# Patient Record
(demographics unavailable — no encounter records)

---

## 2024-11-01 NOTE — PHYSICAL EXAM
[2+] : left 2+ [Varicose Veins Of Lower Extremities] : present [No Rash or Lesion] : No rash or lesion [Ankle Swelling (On Exam)] : present [Ankle Swelling Bilaterally] : bilaterally  [] : not present [Skin Ulcer] : no ulcer [de-identified] : alert and oriented in NAD

## 2024-11-01 NOTE — PLAN
[TextEntry] : 68yo F with b/l C3 CVI and RLE SVT lateral calf.  plan for  R GSV RFA  R SAP UGS L GSV RFA  L SSV RFA L SAP UGS  rx provided for 20-30mmHg thigh high compression stockings risks reviewed with patient

## 2024-11-01 NOTE — HISTORY OF PRESENT ILLNESS
[FreeTextEntry1] : History of present illness:      66yo F presents for bilateral CVI and recent phlebitis of right leg.  She c/o heaviness and fatigue in both legs at end of day.  Denies prior hx of DVT or SVT. Denies prior venous intervention.          Duration of symptoms:    Month(s)        What factors precipitate symptoms?      Prolonged standing           Prolonged sitting            Walking        Exercise                          Symptoms have altered activities of daily living by:          Unable to stand for prolonged period of time       Difficulty completing work tasks              Does the patients daily activity require prolonged standing?           Yes     No        Does the patient take OTC medications (ASA, NSAIDs)?       Yes    No       How many times over a 2 week period do they take OTC meds?     0-1     2-4    4-6             Conservative therapy:      Has the patient previously implemented any conservative therapy or OTC medication for alleviation of symptoms related to varicose veins?           Leg elevation        Walking and other forms of exercise       Weight reduction        NSAIDs or Pain meds                                                                           Other:      Graduated, elasticized compression stockings:     Yes    No      If yes, what kind?    Knee-high Thigh high      20-30mmHg    30-40mmHg

## 2024-11-09 NOTE — PROCEDURE
[FreeTextEntry2] : Chronic Venous Insufficiency, Varicose veins with pain [FreeTextEntry1] : Right GSV RFA  [FreeTextEntry3] : Preoperative Diagnosis: Varicose veins, symptomatic, chronic venous insufficiency (I87.2).            Postoperative Diagnosis: Same        Attending: Chelita Scales MD     Assistant: DONNA Carver       				        Procedure:      1.	Endovenous Radiofrequency Ablation of the Right GSV (10443)              Anesthesia:  Local/Tumescent (50cc 1% Lidocaine in 500cc NS)        Complications: none             Length of segment treated:_24 cm    Description: Chart was reviewed, including ultrasound report and operative plan. Consent was obtained from the patient, risks and benefits of the procedure were explained. Prior to the start of the procedure, the patient was examined in the standing position. The skin was marked to identify superficial varicosities.  The patient was then placed on the procedure table and the entire lower extremity was prepped and a sterile field using barriers was created.  Time out was performed.              1. Using ultrasound guidance a 19-guage needle was placed directly into the saphenous vein. The guide wire was then placed and the needle removed.  A 7F sheath for the RFA catheter was then introduced along the guide wire.  The RFA catheter was then introduced through the sheath.  The catheter tip position was then confirmed at approximately 2 cm from the junction.  The perivenous tissue of the saphenous vein was then anesthetized using Tumescent anesthesia 250 cc of 0.1% lidocaine solution. The catheter placement was again confirmed using ultrasound 2cm from the saphenous junction. Segmental ablation of the saphenous was performed with radiofrequency energy using 120 degrees Celsius for 20 sec each segment (each segment was treated twice). The catheter was removed and local pressure applied.  Ultrasound was again carried out, which demonstrated significant venospasm of the saphenous vein and no evidence of trauma to the deep veins.                     The incisions and injection sites were covered with 4x4 gauze and abdominal pads and the extremity was wrapped with kerlix and Coban using standard technique.  This was followed by application of a 20-30 mm Hg graduated venous compression hose.               The patient was given oral and written instructions for aftercare, which included frequent ambulation and leg elevation.  The patient was instructed to take NSAIDs.  The patient was scheduled for a follow-up visit in approximately 5-7 days with a venous duplex ultrasound of the treated leg to evaluate for success of procedure and evaluate for any deep vein thrombosis. The patient tolerated the procedure well and left the office in excellent condition ambulating without difficulty.

## 2024-11-13 NOTE — CONSULT LETTER
[Dear  ___] : Dear  [unfilled], [Consult Letter:] : I had the pleasure of evaluating your patient, [unfilled]. [Sincerely,] : Sincerely, [DrShae  ___] : Dr. TUBBS

## 2024-11-15 NOTE — HISTORY OF PRESENT ILLNESS
[FreeTextEntry1] : This is a 67 year old postmenopausal  female who was seen for a complaint of a left breast/ axillary mass.  She first noted the mass 4-5 years ago.  It has grown in size and shifted in location from upper mid- breast to upper -outer breast/ left axilla.  She notes a history of a benign left breast biopsy over twenty years ago.    Her family history is significant for hormone negative breast cancer in her mother diagnosed at the age of fifty, who  of metastatic disease at the age of 59.  Her father  in his 80's of pancreatic cancer.  A first cousin had thyroid cancer in her 30's.  Her maternal grandmother, a smoker,  from lung cancer in her 70's.    She is of Romansh, English, Danish heritage.    She saw Rosaline Merchant for genetic counseling and although interested in testing, was reluctant to have it since Medicare would not cover it. Testing was done here in the office and Ambry 13 panel negative.  Risk assessment:  ANTONELLA 10 year 14.9%  Lifetime 26.8%  NCI   5 year 3.9% Lifetime 13%   She underwent excision of the left lipoma on 2024.  Pathology showed a lipoma.  She has some swelling in the area but it is not bothering her.

## 2024-11-15 NOTE — PAST MEDICAL HISTORY
[Surgical Menopause] : The patient is in surgical menopause [Menarche Age ____] : age at menarche was [unfilled] [Menopause Age____] : age at menopause was [unfilled] [Approximately ___] : the LMP was approximately [unfilled] [Total Preg ___] : G[unfilled] [Live Births ___] : P[unfilled]  [Age At Live Birth ___] : Age at live birth: [unfilled] [History of Hormone Replacement Treatment] : has no history of hormone replacement treatment [FreeTextEntry5] : total hysterectomy [FreeTextEntry2] : daughters x2 [FreeTextEntry7] : yes 2 years  [FreeTextEntry8] : no

## 2024-11-15 NOTE — PHYSICAL EXAM
[de-identified] : Small incision near lateral edge of pectoral muscle healing well. Suture end removed.  Small seroma at site verified on ultrasound

## 2024-11-15 NOTE — DATA REVIEWED
[FreeTextEntry1] : Bilateral mammogram ([facility]) 8/21/2024:  No evidence of malignancy.   Last year: Bilateral breast ultrasound 8/5/2023: Left axilla [id number] mm slightly echogenic superficial ovoid finding favored to be lipomatous finding.  Does not appear significantly changed in size from 2021 exam.  Bilateral breast MRI 10/7/2024:  No MRI evidence of malignancy.

## 2024-11-15 NOTE — HISTORY OF PRESENT ILLNESS
[FreeTextEntry1] : This is a 67 year old postmenopausal  female who was seen for a complaint of a left breast/ axillary mass.  She first noted the mass 4-5 years ago.  It has grown in size and shifted in location from upper mid- breast to upper -outer breast/ left axilla.  She notes a history of a benign left breast biopsy over twenty years ago.    Her family history is significant for hormone negative breast cancer in her mother diagnosed at the age of fifty, who  of metastatic disease at the age of 59.  Her father  in his 80's of pancreatic cancer.  A first cousin had thyroid cancer in her 30's.  Her maternal grandmother, a smoker,  from lung cancer in her 70's.    She is of Faroese, English, Greek heritage.    She saw Rosaline Merchant for genetic counseling and although interested in testing, was reluctant to have it since Medicare would not cover it. Testing was done here in the office and Ambry 13 panel negative.  Risk assessment:  ANTONELLA 10 year 14.9%  Lifetime 26.8%  NCI   5 year 3.9% Lifetime 13%   She underwent excision of the left lipoma on 2024.  Pathology showed a lipoma.  She has some swelling in the area but it is not bothering her.

## 2024-11-15 NOTE — PHYSICAL EXAM
[de-identified] : Small incision near lateral edge of pectoral muscle healing well. Suture end removed.  Small seroma at site verified on ultrasound

## 2024-11-15 NOTE — DATA REVIEWED
[FreeTextEntry1] : Bilateral mammogram ([facility]) 8/21/2024:  No evidence of malignancy.   Last year: Bilateral breast ultrasound 8/5/2023: Left axilla 15u3b41 mm slightly echogenic superficial ovoid finding favored to be lipomatous finding.  Does not appear significantly changed in size from 2021 exam.  Bilateral breast MRI 10/7/2024:  No MRI evidence of malignancy.

## 2024-11-25 NOTE — REASON FOR VISIT
Medication(s) to Refill:   Requested Prescriptions     Pending Prescriptions Disp Refills   • VALACYCLOVIR  MG Oral Tab [Pharmacy Med Name: VALACYCLOVIR 500MG TABLETS] 30 tablet 0     Sig: TAKE 1 TABLET(500 MG) BY MOUTH EVERY DAY         Reason for [Follow-Up: _____] : a [unfilled] follow-up visit

## 2024-11-27 NOTE — PHYSICAL EXAM
[de-identified] : Small incision near lateral edge of pectoral muscle healing well.  Swelling decreased. Small seroma at site verified on ultrasound.

## 2024-11-27 NOTE — HISTORY OF PRESENT ILLNESS
[FreeTextEntry1] : This is a 67 year old postmenopausal  female who was seen for a complaint of a left breast/ axillary mass.  She first noted the mass 4-5 years ago.  It has grown in size and shifted in location from upper mid- breast to upper -outer breast/ left axilla.  She notes a history of a benign left breast biopsy over twenty years ago.    Her family history is significant for hormone negative breast cancer in her mother diagnosed at the age of fifty, who  of metastatic disease at the age of 59.  Her father  in his 80's of pancreatic cancer.  A first cousin had thyroid cancer in her 30's.  Her maternal grandmother, a smoker,  from lung cancer in her 70's.    She is of Turkish, English, Romanian heritage.    She saw Rosaline Merchant for genetic counseling and although interested in testing, was reluctant to have it since Medicare would not cover it. Testing was done here in the office and Ambry 13 panel negative.  Risk assessment:  ANTONELLA 10 year 14.9%  Lifetime 26.8%  NCI   5 year 3.9% Lifetime 13%   She underwent excision of the left lipoma on 2024.  Pathology showed a lipoma.  The surgical area is less swollen.

## 2024-11-27 NOTE — PHYSICAL EXAM
[de-identified] : Small incision near lateral edge of pectoral muscle healing well.  Swelling decreased. Small seroma at site verified on ultrasound.

## 2024-11-27 NOTE — HISTORY OF PRESENT ILLNESS
[FreeTextEntry1] : This is a 67 year old postmenopausal  female who was seen for a complaint of a left breast/ axillary mass.  She first noted the mass 4-5 years ago.  It has grown in size and shifted in location from upper mid- breast to upper -outer breast/ left axilla.  She notes a history of a benign left breast biopsy over twenty years ago.    Her family history is significant for hormone negative breast cancer in her mother diagnosed at the age of fifty, who  of metastatic disease at the age of 59.  Her father  in his 80's of pancreatic cancer.  A first cousin had thyroid cancer in her 30's.  Her maternal grandmother, a smoker,  from lung cancer in her 70's.    She is of [place], English, Korean heritage.    She saw Rosaline Merchant for genetic counseling and although interested in testing, was reluctant to have it since Medicare would not cover it. Testing was done here in the office and Ambry 13 panel negative.  Risk assessment:  ANTONELLA 10 year 14.9%  Lifetime 26.8%  NCI   5 year 3.9% Lifetime 13%   She underwent excision of the left lipoma on 2024.  Pathology showed a lipoma.  The surgical area is less swollen.

## 2024-11-27 NOTE — PROCEDURE
[FreeTextEntry1] : I&D [FreeTextEntry2] : phlebitis [FreeTextEntry3] : Preoperative Diagnosis: Varicose veins, symptomatic, phlebitis Postoperative Diagnosis: Same      Attending:  Chelita Scales MD     Assistant: KATARINA Orlando       				      Procedure:      1.	Incision and drainage of right leg phlebitis          Anesthesia: x Local/Tumescent (50cc 1% Lidocaine in 500cc NS) _ MAC       Complications: none              Description: Chart was reviewed, including ultrasound report and operative plan. Consent was obtained from the patient, risks and benefits of the procedure were explained. Prior to the start of the procedure, the patient was examined in the standing position. The skin was marked to identify superficial varicosities.  The patient was then placed on the procedure table and the entire lower extremity was prepped and a sterile field using barriers was created.  Time out was performed.             1. The area of the marked surface varicosities was anesthetized using tumescent anesthesia, 50 cc of 0.1% lidocaine solution. The marked area of phlebitis was accessed with a 16G Nokor Needle.  Thrombus was extracted, the diseased vein segments were removed using phlebectomy hooks and mosquitoes.  Hemostasis was obtained with compression and wash-out of the subcutaneous space.            The incisions and injection sites were covered with 4x4 gauze and abdominal pads and the extremity was wrapped with kerlix and Coban using standard technique.  This was followed by application of a 20-30 mm Hg graduated venous compression hose.               The patient was given oral and written instructions for aftercare, which included frequent ambulation and leg elevation.  The patient was instructed to take NSAIDs.  The patient was scheduled for a follow-up visit in approximately 5-7 days with a venous duplex ultrasound of the treated leg to evaluate for success of procedure and evaluate for any deep vein thrombosis. The patient tolerated the procedure well and left the office in excellent condition ambulating without difficulty.

## 2024-11-27 NOTE — DATA REVIEWED
[FreeTextEntry1] : Bilateral mammogram ([facility]) 8/21/2024:  No evidence of malignancy.   Last year: Bilateral breast ultrasound 8/5/2023: Left axilla 94u8i36 mm slightly echogenic superficial ovoid finding favored to be lipomatous finding.  Does not appear significantly changed in size from 2021 exam.  Bilateral breast MRI 10/7/2024:  No MRI evidence of malignancy.

## 2024-11-27 NOTE — DATA REVIEWED
[FreeTextEntry1] : Bilateral mammogram ([facility]) 8/21/2024:  No evidence of malignancy.   Last year: Bilateral breast ultrasound 8/5/2023: Left axilla 17p8w81 mm slightly echogenic superficial ovoid finding favored to be lipomatous finding.  Does not appear significantly changed in size from 2021 exam.  Bilateral breast MRI 10/7/2024:  No MRI evidence of malignancy.

## 2024-12-06 NOTE — DISCUSSION/SUMMARY
[FreeTextEntry1] : 67 year old female with CVI, SVT s/p gsv aye and I &D of thrombophlebitis now with EHIT -will begin 10mg xarelto qd, will send rx for 30 days to be taken as prophylaxis after additional left LE interventions  -will need right sap ugs for residual dilated posterior calf vv

## 2024-12-06 NOTE — PHYSICAL EXAM
[2+] : right 2+ [Ankle Swelling (On Exam)] : not present [FreeTextEntry1] : RLE warm, well perfused  resolving SVT without cellulitis no residual dilated vv  telangiectasias noted   Duplex: GSV thrombosed; tail extension of thrombus into the CFV  thrombosed prox calf vv

## 2025-01-12 NOTE — HISTORY OF PRESENT ILLNESS
[FreeTextEntry1] : 25 Last seen  initial presentation RASH..   Still with recurrent rash/ pruritus...  Has high blood histamine level and WBCs have deformity (pelger-huet- ? primary vs. 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  Switched to 300mg biweekly Adbry (IL13 inhibitor) with Ricardo Thompson allergist and face improved, but hasn't taken since Dec 8 - Adbry causes back to break out as well- now off both Saw derm, lipoma taken out of L axillary area- benign Presents with diffuse pruritus today but no overt rash.    No active joint pain/ synovitis no or in past few years   GI Was taking 40mg Omeprazole, EGD found to have severe HH-> required sx (stomach fully within chest wall cavity) minimal symptoms despite severity.  Repair surgery in 2024 ([facility]), no Sx since and no further need for PPI Liquid diet for 2w around surgery improved her rash.., may have had silent reflux for years beforehand  Osteoporosis Has been taking Evista for several years monotx - no hx fractures.   Exercises 4 days per week for more than 35-40min - pilates, spin  Bone density 2023 Most severe T-score -3.3 at L2 spine -2.7 at R femoral neck No VFA FRAX calcs: overall 24% risk of fracture, 6% risk of hip fracture  Labs 10/24 most recent available Normal CBC, CMP, CRP, S-PEP  Cardio/vasc Experiences some dizziness due to low blood pressure- better lately  REcent superficial blood clot (hx large varicosities) x 2.. was on Eliquis- 2.5 mg BID, now off..   Neg Hepatitis test Quantiferon negative  1) Atopic Dermatitis:  2) Athralgias with + CCP 64, +dsDNA/ DREAD 3) HYpothyroidism 4) Aneurysm Occipital and new onset superficial blood clot BLE with Large varicosities 5) Osteoporosis with hx trauma induced tibial fracture    _________________________________________________________ INitial presentation  - presenting for consultation for flares of rashes.......has had flares in , September and currently presenting in office w/rash throughout scalp, under breasts, neck , and face....she reports that current rash is "calm" today.....she has used antibiotics and creams in the past, but dupixen has been the most effective.....dupixen is currently stopping the itching, but the rash is still present - during most recent flare she had swollen lymph nodes in her neck....and difficulty raising her R shoulder  - No FH of rheumatoid arthritis  - Denies DVT, strokes, or myocardial infarctions.....has had an aneurysm in the past and currently has varicose veins - Hx of miscarriage at 12 weeks.....M1 - Up to date on DEXA, everything was normal  - 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 Osgood.. suggested possible SLE.. with multiple mildly elevated labs:  + DREAD (not quanitified), low + dsDNA, Scl70, low C4, mildly elevated CK, Ferritin and triple + APS studies x 1 (available for review)  Diffuse rash.. etiology unclear after extensive (difffuse) w/u started on dupixent for ezcema with + response.. greatly improved today .. but wants to r/o SLE Denies significant fatigue, fevers/ chills, lymphadenopathy, + lipoma on left chest wall... ys ago hair thinning with hypothyroidism improved with replacement for many  No mucositis, sicca, cardiopulmonary.. till recently now with R sided pleuritic pain (first episode - 1m ago and getting better), no raynauds but hands get numb and tingling in finger tips  severe varicosities.. seen by vasc sx.. no sx indicated , 1 miscarriage at 12 wk (r/t mechanical issues w/ adherance),  + Aneurysm around occipital: presented with severe HA.. watching at this point, no vision change.. HA now resolved.. for past yr.. 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 no MI/ DVT/ PE/ CVA..  Taking 325 mg asa..  REcent tibial fracture with fracture... Dexa:  osteopenia.   Post menopausal    Hematology:  w/u Dr. Darren byrnes

## 2025-01-12 NOTE — REVIEW OF SYSTEMS
[Lower Ext Edema] : lower extremity edema [Swollen Glands In The Neck] : swollen glands in the neck [Negative] : Heme/Lymph [As Noted in HPI] : as noted in HPI [Heartburn] : heartburn [de-identified] : Claims memory loss-  [Joint Pain] : no joint pain [FreeTextEntry2] : stable weight [FreeTextEntry3] : denies IED  [FreeTextEntry4] : Soft teeth- but no active dental issues - absolutely denies oral/ nasal ulcerations  [FreeTextEntry5] : varicose veins - large and NEW onset superficial thrombophlebitis- chronic venous insufficiency [FreeTextEntry7] : VERY large HH s/p repair '24 doing well  [FreeTextEntry8] : medically-induced menopause in 2007- no vaginal ulcerations  [FreeTextEntry9] : denies at this time..  [de-identified] : Rash resolved but ++  pruritus.  Previous diffuse rash throughout face, scalp, neck, and under breasts; denies EN, or other distinct lesions. Denies pathergy [de-identified] : hx mild memory loss in past, minimal now [de-identified] : swollen lymph nodes in neck - in past, not active in past year or more

## 2025-01-12 NOTE — CONSULT LETTER
[Dear  ___] : Dear  [unfilled], [Courtesy Letter:] : I had the pleasure of seeing your patient, [unfilled], in my office today. [Referral Closing:] : Thank you very much for seeing this patient.  If you have any questions, please do not hesitate to contact me. [Sincerely,] : Sincerely, [FreeTextEntry2] : Tavares Negro MD  [FreeTextEntry1] : Last seen , updated evaluation:    66 yo w/ atopic dermatitis, Hypothyroidism, + occipital aneurysm (small being followed) and new onset superficial thrombophlebitis x 2 (), osteopenia-> Osteoporosis, and increase in joint pain lately.. feels sx worse since dupixent with suggestion of SLE/ AI process, here for repeat eval.. seen ys ago Dr. Osgood rheum w/ neg w/u beyond DREAD 1:160H/S, now with + CCP, dsDNA and + ACL/ B2G... with low C3/4..  Not seen since ..   CC:  diffuse pruritus, rash- still unclear etiology.. and Osteoporosis  1) Atopic dermatitis: Low complements- CH50 24/ low C3 repeatedly, recently diffuse, failed to respond to traditional tx.. started use of dupixent with excellent control for past few years-> but recent lost efficacy and switched to  Adbry ([place]13i only) working with Dr Stark allergist...but developed  severe red/ swollen face and stopped.. now rash remains controlled but diffuse pruritus.  bx  suggests granulomatous changes.?? NO previous EN or other distinct nodular/ ulcerative lesions.  - discussed with Dr. Causey updated labs + ACL/B2G w/ neg LAC and all other serologies negative except low C4.. at 3, will check CH50 but rash has not been urticarial at all ..  Pt asking about high histamine levels/ mast cell activation syndrome (reportedly w/u neg in past)  2) Arthralgias w/ with multiple mildly elevated labs:  + DREAD (not quanitified) and low + CCP 64, low + dsDNA- now neg, Scl70, low C4 and + ACL/ B2G IGG/M/A, mildly elevated CK, Ferritin and triple + APS studies x 1 No recent arthralgias or synovitis (ever)..  One episode pleuritis years ago, nothing recent..  Still denies significant fatigue, fevers/ chills, lymphadenopathy, no mucositis, sicca, cardiopulmonary, absolutely no raynauds but hands get numb and tingling in finger tips. NO evidence otherwise of RA at this time , 1 miscarriage at 12 wk (r/t mechanical issues w/ adherance), with no hx of cytopenias or no MI/ DVT/ PE/ CVA.. See below for new onset thrombophlebitis.. 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 - Varicosities: seen by vasc sx.. no sx indicated, but suggest compression stockings to minimize pooling given APS antibodies  - updated APS eval needed now (especially given CBC with clumped plt) - Consideration for possible Behcet's given vascular changes as described but does NOT have hx IED, mucositis or other features.  Will check [id number]   5) Osteoporosis: Most recent Dexa 23 Most severe T-score -3.3 at L2 spine and -2.7 at R femoral neck No VFA with FRAX overall 24% risk of fracture, 6% risk of hip fracture  tibial fracture trauma induced, - no recent new fractures since . Has been c/w taking Evista for several years monotx    Exercises 4 days per week for more than 35-40min - pilates, spin Menopause: sx age 50 no formal HRT  Tx considerations:  no active dental issues (but hx of multiple issues in past- check routinely), no hx renal calculi, or hx of radiation therapy.   Metabolic w/u: 25 nl PTH/ Ca, Vit D 39, TFTs, CBC, CMP, CRP/ ESR, SPEP, Fe, Mg, Po4, B12/folate, A1c, neg Celiac NOTE: - DXA 19 with most severe T score -2.8  at LFN - 2.4 at spine.   Initially dx  on Fosamax x 10 ys - drug holiday x 1 year.. then  started Evista.. tolerated well- stopped    Discussion:  needs more aggressive tx given obvious progression from previous studies... despite Evista.  Highly recommend use of Prolia for now.. but could theoretically consider PTH.. would not be candidate for romosozumab 2/2 APS studies and thrombophlebitis.  Would suggest starting with Prolia initially given lack of fragility fractures.      Plan 25: - Begin Prolia as soon as approved  - Stop Evista  - Ordered new labs today - check IgE and histamine.  Also concerned for possible APS / or Behcet's labs pending will need RPA   - See dentist at least 2x per year and baseline to confirm no acute issues. If extensive dental issues needed (implants/ extraction) may consider PTH...   - continue to f/u with allergist/ derm to manage rash/ pruritus   - Is aware to call if there is any change in her underlying symptoms.  [FreeTextEntry3] : Bee Damon DNP, [facility] Division or Rheumatology [facility]

## 2025-01-12 NOTE — DATA REVIEWED
[FreeTextEntry1] : Labs \par  10/22 positive: C4 is 3 (decrease), CCP 64,  (neg TG), B2G:  IGA 26, IGG 51, IGM 41, ACL: IGG 26,  , IGA 16; neg: RF, APS/LAC, dsDNA, STAN[place]ORD, SSA/B, DREAD; nl UA, CBC, ESR, CMP, CRP, C3\par  \par  2/21 low C4- 4, nl C3,  + ACL/ B2G, and Phosphatidyl serine triple +, low + dsDNA, low + Scl70 25, nl CBC, , CMP, SPEP, ESR/CRP , + DREAD (not quantified), IGG subclasses\par  + TPO/ TG, nl TFT, neg tTg/ Endomysial/ gliadin, INES/ ScL70 , ANCA P/C, MPO/ PR3, STANFORD, SSA/B, SMA, AMA, JO1, , RF/ CCP, HLAB27, Immunofixation, QFT, Hep B/ C, Lyme, ACE, no proteinuria

## 2025-01-12 NOTE — PHYSICAL EXAM
[General Appearance - Alert] : alert [General Appearance - In No Acute Distress] : in no acute distress [General Appearance - Well Nourished] : well nourished [General Appearance - Well Developed] : well developed [General Appearance - Well-Appearing] : healthy appearing [Sclera] : the sclera and conjunctiva were normal [PERRL With Normal Accommodation] : pupils were equal in size, round, and reactive to light [Extraocular Movements] : extraocular movements were intact [Outer Ear] : the ears and nose were normal in appearance [Nasal Cavity] : the nasal mucosa and septum were normal [Oropharynx] : the oropharynx was normal [Neck Appearance] : the appearance of the neck was normal [Neck Cervical Mass (___cm)] : no neck mass was observed [Jugular Venous Distention Increased] : there was no jugular-venous distention [Thyroid Diffuse Enlargement] : the thyroid was not enlarged [Thyroid Nodule] : there were no palpable thyroid nodules [Auscultation Breath Sounds / Voice Sounds] : lungs were clear to auscultation bilaterally [Heart Rate And Rhythm] : heart rate was normal and rhythm regular [Heart Sounds] : normal S1 and S2 [Heart Sounds Gallop] : no gallops [Murmurs] : no murmurs [Heart Sounds Pericardial Friction Rub] : no pericardial rub [Full Pulse] : the pedal pulses are present [Edema] : there was no peripheral edema [Cervical Lymph Nodes Enlarged Posterior Bilaterally] : posterior cervical [Cervical Lymph Nodes Enlarged Anterior Bilaterally] : anterior cervical [Supraclavicular Lymph Nodes Enlarged Bilaterally] : supraclavicular [No CVA Tenderness] : no ~M costovertebral angle tenderness [No Spinal Tenderness] : no spinal tenderness [Skin Color & Pigmentation] : normal skin color and pigmentation [Skin Turgor] : normal skin turgor [No Focal Deficits] : no focal deficits [Sensation] : the sensory exam was normal to light touch and pinprick [Oriented To Time, Place, And Person] : oriented to person, place, and time [Impaired Insight] : insight and judgment were intact [Affect] : the affect was normal [Abnormal Walk] : normal gait [Nail Clubbing] : no clubbing  or cyanosis of the fingernails [Musculoskeletal - Swelling] : no joint swelling seen [Motor Tone] : muscle strength and tone were normal [Respiration, Rhythm And Depth] : normal respiratory rhythm and effort [Exaggerated Use Of Accessory Muscles For Inspiration] : no accessory muscle use [Bowel Sounds] : normal bowel sounds [Abdomen Soft] : soft [Abdomen Tenderness] : non-tender [] : no hepato-splenomegaly [Abdomen Mass (___ Cm)] : no abdominal mass palpated [Axillary Lymph Nodes Enlarged Bilaterally] : axillary [Inguinal Lymph Nodes Enlarged Bilaterally] : inguinal [FreeTextEntry1] : NO active synovitis now or last visit 2022.   There were no flexion contractures or deformities  in any joints. All joints have full ROM  without warmth, erythema, effusion or tenderness.

## 2025-01-12 NOTE — CONSULT LETTER
[Dear  ___] : Dear  [unfilled], [Courtesy Letter:] : I had the pleasure of seeing your patient, [unfilled], in my office today. [Referral Closing:] : Thank you very much for seeing this patient.  If you have any questions, please do not hesitate to contact me. [Sincerely,] : Sincerely, [FreeTextEntry2] : Tavares Negro MD  [FreeTextEntry1] : Last seen , updated evaluation:    68 yo w/ atopic dermatitis, Hypothyroidism, + occipital aneurysm (small being followed) and new onset superficial thrombophlebitis x 2 (), osteopenia-> Osteoporosis, and increase in joint pain lately.. feels sx worse since dupixent with suggestion of SLE/ AI process, here for repeat eval.. seen ys ago Dr. Osgood rheum w/ neg w/u beyond DREAD 1:160H/S, now with + CCP, dsDNA and + ACL/ B2G... with low C3/4..  Not seen since ..   CC:  diffuse pruritus, rash- still unclear etiology.. and Osteoporosis  1) Atopic dermatitis: Low complements- CH50 24/ low C3 repeatedly, recently diffuse, failed to respond to traditional tx.. started use of dupixent with excellent control for past few years-> but recent lost efficacy and switched to  Adbry ([place]13i only) working with Dr Stark allergist...but developed  severe red/ swollen face and stopped.. now rash remains controlled but diffuse pruritus.  bx  suggests granulomatous changes.?? NO previous EN or other distinct nodular/ ulcerative lesions.  - discussed with Dr. Causey updated labs + ACL/B2G w/ neg LAC and all other serologies negative except low C4.. at 3, will check CH50 but rash has not been urticarial at all ..  Pt asking about high histamine levels/ mast cell activation syndrome (reportedly w/u neg in past)  2) Arthralgias w/ with multiple mildly elevated labs:  + DREAD (not quanitified) and low + CCP 64, low + dsDNA- now neg, Scl70, low C4 and + ACL/ B2G IGG/M/A, mildly elevated CK, Ferritin and triple + APS studies x 1 No recent arthralgias or synovitis (ever)..  One episode pleuritis years ago, nothing recent..  Still denies significant fatigue, fevers/ chills, lymphadenopathy, no mucositis, sicca, cardiopulmonary, absolutely no raynauds but hands get numb and tingling in finger tips. NO evidence otherwise of RA at this time , 1 miscarriage at 12 wk (r/t mechanical issues w/ adherance), with no hx of cytopenias or no MI/ DVT/ PE/ CVA.. See below for new onset thrombophlebitis.. 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 - Varicosities: seen by vasc sx.. no sx indicated, but suggest compression stockings to minimize pooling given APS antibodies  - updated APS eval needed now (especially given CBC with clumped plt) - Consideration for possible Behcet's given vascular changes as described but does NOT have hx IED, mucositis or other features.  Will check [id number]   5) Osteoporosis: Most recent Dexa 23 Most severe T-score -3.3 at L2 spine and -2.7 at R femoral neck No VFA with FRAX overall 24% risk of fracture, 6% risk of hip fracture  tibial fracture trauma induced, - no recent new fractures since . Has been c/w taking Evista for several years monotx    Exercises 4 days per week for more than 35-40min - pilates, spin Menopause: sx age 50 no formal HRT  Tx considerations:  no active dental issues (but hx of multiple issues in past- check routinely), no hx renal calculi, or hx of radiation therapy.   Metabolic w/u: 25 nl PTH/ Ca, Vit D 39, TFTs, CBC, CMP, CRP/ ESR, SPEP, Fe, Mg, Po4, B12/folate, A1c, neg Celiac NOTE: - DXA 19 with most severe T score -2.8  at LFN - 2.4 at spine.   Initially dx  on Fosamax x 10 ys - drug holiday x 1 year.. then  started Evista.. tolerated well- stopped    Discussion:  needs more aggressive tx given obvious progression from previous studies... despite Evista.  Highly recommend use of Prolia for now.. but could theoretically consider PTH.. would not be candidate for romosozumab 2/2 APS studies and thrombophlebitis.  Would suggest starting with Prolia initially given lack of fragility fractures.      Plan 25: - Begin Prolia as soon as approved  - Stop Evista  - Ordered new labs today - check IgE and histamine.  Also concerned for possible APS / or Behcet's labs pending will need RPA   - See dentist at least 2x per year and baseline to confirm no acute issues. If extensive dental issues needed (implants/ extraction) may consider PTH...   - continue to f/u with allergist/ derm to manage rash/ pruritus   - Is aware to call if there is any change in her underlying symptoms.  [FreeTextEntry3] : Bee Damon DNP, ANP-[facility] Division or Rheumatology [facility]

## 2025-01-12 NOTE — ASSESSMENT
[FreeTextEntry1] : 66 yo w/ atopic dermatitis, Hypothyroidism, + occipital aneurysm (small being followed) and new onset superficial thrombophlebitis x 2 (), osteopenia-> Osteoporosis, and increase in joint pain lately.. feels sx worse since dupixent with suggestion of SLE/ AI process, here for repeat eval.. seen ys ago Dr. Osgood rheum w/ neg w/u beyond DREAD 1:160H/S, now with + CCP, dsDNA and + ACL/ B2G... with low C3/4..  Not seen since ..   CC:  diffuse pruritus, rash- still unclear etiology.. and Osteoporosis  1) Atopic dermatitis: Low complements- CH50 24/ low C3 repeatedly, recently diffuse, failed to respond to traditional tx.. started use of dupixent with excellent control for past few years-> but recent lost efficacy and switched to  Adbry ([place]13i only) working with Dr Stark allergist...but developed  severe red/ swollen face and stopped.. now rash remains controlled but diffuse pruritus.  bx  suggests granulomatous changes.?? NO previous EN or other distinct nodular/ ulcerative lesions.  - discussed with Dr. Causey updated labs + ACL/B2G w/ neg LAC and all other serologies negative except low C4.. at 3, will check CH50 but rash has not been urticarial at all ..  Pt asking about high histamine levels/ mast cell activation syndrome (reportedly w/u neg in past)  2) Arthralgias w/ with multiple mildly elevated labs:  + DREAD (not quanitified) and low + CCP 64, low + dsDNA- now neg, Scl70, low C4 and + ACL/ B2G IGG/M/A, mildly elevated CK, Ferritin and triple + APS studies x 1 No recent arthralgias or synovitis (ever)..  One episode pleuritis years ago, nothing recent..  Still denies significant fatigue, fevers/ chills, lymphadenopathy, no mucositis, sicca, cardiopulmonary, absolutely no raynauds but hands get numb and tingling in finger tips. NO evidence otherwise of RA at this time , 1 miscarriage at 12 wk (r/t mechanical issues w/ adherance), with no hx of cytopenias or no MI/ DVT/ PE/ CVA.. See below for new onset thrombophlebitis.. 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 - Varicosities: seen by vasc sx.. no sx indicated, but suggest compression stockings to minimize pooling given APS antibodies  - updated APS eval needed now (especially given CBC with clumped plt) - Consideration for possible Behcet's given vascular changes as described but does NOT have hx IED, mucositis or other features.  Will check [id number]   5) Osteoporosis: Most recent Dexa 23 Most severe T-score -3.3 at L2 spine and -2.7 at R femoral neck No VFA with FRAX overall 24% risk of fracture, 6% risk of hip fracture  tibial fracture trauma induced, - no recent new fractures since . Has been c/w taking Evista for several years monotx    Exercises 4 days per week for more than 35-40min - pilates, spin Menopause: sx age 50 no formal HRT  Tx considerations:  no active dental issues (but hx of multiple issues in past- check routinely), no hx renal calculi, or hx of radiation therapy.   Metabolic w/u: 25 nl PTH/ Ca, Vit D 39, TFTs, CBC, CMP, CRP/ ESR, SPEP, Fe, Mg, Po4, B12/folate, A1c, neg Celiac NOTE: - DXA 19 with most severe T score -2.8  at LFN - 2.4 at spine.   Initially dx  on Fosamax x 10 ys - drug holiday x 1 year.. then  started Evista.. tolerated well- stopped    Discussion:  needs more aggressive tx given obvious progression from previous studies... despite Evista.  Highly recommend use of Prolia for now.. but could theoretically consider PTH.. would not be candidate for romosozumab 2/2 APS studies and thrombophlebitis.  Would suggest starting with Prolia initially given lack of fragility fractures.      Plan 25: - Begin Prolia as soon as approved  - Stop Evista  - Ordered new labs today - check IgE and histamine.  Also concerned for possible APS / or Behcet's labs pending will need RPA   - See dentist at least 2x per year and baseline to confirm no acute issues. If extensive dental issues needed (implants/ extraction) may consider PTH...   - continue to f/u with allergist/ derm to manage rash/ pruritus   - Is aware to call if there is any change in her underlying symptoms.

## 2025-01-12 NOTE — PHYSICAL EXAM
[General Appearance - Alert] : alert [General Appearance - In No Acute Distress] : in no acute distress [General Appearance - Well Nourished] : well nourished [General Appearance - Well Developed] : well developed [General Appearance - Well-Appearing] : healthy appearing [Sclera] : the sclera and conjunctiva were normal [PERRL With Normal Accommodation] : pupils were equal in size, round, and reactive to light [Extraocular Movements] : extraocular movements were intact [Outer Ear] : the ears and nose were normal in appearance [Nasal Cavity] : the nasal mucosa and septum were normal [Oropharynx] : the oropharynx was normal [Neck Appearance] : the appearance of the neck was normal [Neck Cervical Mass (___cm)] : no neck mass was observed [Jugular Venous Distention Increased] : there was no jugular-venous distention [Thyroid Diffuse Enlargement] : the thyroid was not enlarged [Thyroid Nodule] : there were no palpable thyroid nodules [Auscultation Breath Sounds / Voice Sounds] : lungs were clear to auscultation bilaterally [Heart Rate And Rhythm] : heart rate was normal and rhythm regular [Heart Sounds] : normal S1 and S2 [Heart Sounds Gallop] : no gallops [Murmurs] : no murmurs [Heart Sounds Pericardial Friction Rub] : no pericardial rub [Full Pulse] : the pedal pulses are present [Edema] : there was no peripheral edema [Cervical Lymph Nodes Enlarged Posterior Bilaterally] : posterior cervical [Cervical Lymph Nodes Enlarged Anterior Bilaterally] : anterior cervical [Supraclavicular Lymph Nodes Enlarged Bilaterally] : supraclavicular [No CVA Tenderness] : no ~M costovertebral angle tenderness [No Spinal Tenderness] : no spinal tenderness [Skin Color & Pigmentation] : normal skin color and pigmentation [Skin Turgor] : normal skin turgor [No Focal Deficits] : no focal deficits [Sensation] : the sensory exam was normal to light touch and pinprick [Oriented To Time, Place, And Person] : oriented to person, place, and time [Impaired Insight] : insight and judgment were intact [Affect] : the affect was normal [Abnormal Walk] : normal gait [Nail Clubbing] : no clubbing  or cyanosis of the fingernails [Musculoskeletal - Swelling] : no joint swelling seen [Motor Tone] : muscle strength and tone were normal [Respiration, Rhythm And Depth] : normal respiratory rhythm and effort [Exaggerated Use Of Accessory Muscles For Inspiration] : no accessory muscle use [Bowel Sounds] : normal bowel sounds [Abdomen Soft] : soft [Abdomen Tenderness] : non-tender [Abdomen Mass (___ Cm)] : no abdominal mass palpated [] : no hepato-splenomegaly [Axillary Lymph Nodes Enlarged Bilaterally] : axillary [Inguinal Lymph Nodes Enlarged Bilaterally] : inguinal [FreeTextEntry1] : NO active synovitis now or last visit 2022.   There were no flexion contractures or deformities  in any joints. All joints have full ROM  without warmth, erythema, effusion or tenderness.

## 2025-01-12 NOTE — REVIEW OF SYSTEMS
[Lower Ext Edema] : lower extremity edema [Swollen Glands In The Neck] : swollen glands in the neck [Negative] : Heme/Lymph [As Noted in HPI] : as noted in HPI [Heartburn] : heartburn [de-identified] : Claims memory loss-  [Joint Pain] : no joint pain [FreeTextEntry2] : stable weight [FreeTextEntry3] : denies IED  [FreeTextEntry4] : Soft teeth- but no active dental issues - absolutely denies oral/ nasal ulcerations  [FreeTextEntry5] : varicose veins - large and NEW onset superficial thrombophlebitis- chronic venous insufficiency [FreeTextEntry7] : VERY large HH s/p repair '24 doing well  [FreeTextEntry8] : medically-induced menopause in 2007- no vaginal ulcerations  [FreeTextEntry9] : denies at this time..  [de-identified] : Rash resolved but ++  pruritus.  Previous diffuse rash throughout face, scalp, neck, and under breasts; denies EN, or other distinct lesions. Denies pathergy [de-identified] : hx mild memory loss in past, minimal now [de-identified] : swollen lymph nodes in neck - in past, not active in past year or more

## 2025-01-12 NOTE — HISTORY OF PRESENT ILLNESS
[FreeTextEntry1] : 25 Last seen  initial presentation RASH..   Still with recurrent rash/ pruritus...  Has high blood histamine level and WBCs have deformity (pelger-huet- ? primary vs. 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  Switched to 300mg biweekly Adbry (IL13 inhibitor) with Ricardo Thompson allergist and face improved, but hasn't taken since Dec 8 - Adbry causes back to break out as well- now off both Saw derm, lipoma taken out of L axillary area- benign Presents with diffuse pruritus today but no overt rash.    No active joint pain/ synovitis no or in past few years   GI Was taking 40mg Omeprazole, EGD found to have severe HH-> required sx (stomach fully within chest wall cavity) minimal symptoms despite severity.  Repair surgery in 2024 (Du), no Sx since and no further need for PPI Liquid diet for 2w around surgery improved her rash.., may have had silent reflux for years beforehand  Osteoporosis Has been taking Evista for several years monotx - no hx fractures.   Exercises 4 days per week for more than 35-40min - pilates, spin  Bone density 2023 Most severe T-score -3.3 at L2 spine -2.7 at R femoral neck No VFA FRAX calcs: overall 24% risk of fracture, 6% risk of hip fracture  Labs 10/24 most recent available Normal CBC, CMP, CRP, S-PEP  Cardio/vasc Experiences some dizziness due to low blood pressure- better lately  REcent superficial blood clot (hx large varicosities) x 2.. was on Eliquis- 2.5 mg BID, now off..   Neg Hepatitis test Quantiferon negative  1) Atopic Dermatitis:  2) Athralgias with + CCP 64, +dsDNA/ DREAD 3) HYpothyroidism 4) Aneurysm Occipital and new onset superficial blood clot BLE with Large varicosities 5) Osteoporosis with hx trauma induced tibial fracture    _________________________________________________________ INitial presentation  - presenting for consultation for flares of rashes.......has had flares in , September and currently presenting in office w/rash throughout scalp, under breasts, neck , and face....she reports that current rash is "calm" today.....she has used antibiotics and creams in the past, but dupixen has been the most effective.....dupixen is currently stopping the itching, but the rash is still present - during most recent flare she had swollen lymph nodes in her neck....and difficulty raising her R shoulder  - No FH of rheumatoid arthritis  - Denies DVT, strokes, or myocardial infarctions.....has had an aneurysm in the past and currently has varicose veins - Hx of miscarriage at 12 weeks.....M1 - Up to date on DEXA, everything was normal  - 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 Osgood.. suggested possible SLE.. with multiple mildly elevated labs:  + DREAD (not quanitified), low + dsDNA, Scl70, low C4, mildly elevated CK, Ferritin and triple + APS studies x 1 (available for review)  Diffuse rash.. etiology unclear after extensive (difffuse) w/u started on dupixent for ezcema with + response.. greatly improved today .. but wants to r/o SLE Denies significant fatigue, fevers/ chills, lymphadenopathy, + lipoma on left chest wall... ys ago hair thinning with hypothyroidism improved with replacement for many  No mucositis, sicca, cardiopulmonary.. till recently now with R sided pleuritic pain (first episode - 1m ago and getting better), no raynauds but hands get numb and tingling in finger tips  severe varicosities.. seen by vasc sx.. no sx indicated , 1 miscarriage at 12 wk (r/t mechanical issues w/ adherance),  + Aneurysm around occipital: presented with severe HA.. watching at this point, no vision change.. HA now resolved.. for past yr.. 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 no MI/ DVT/ PE/ CVA..  Taking 325 mg asa..  REcent tibial fracture with fracture... Dexa:  osteopenia.   Post menopausal    Hematology:  w/u Dr. Darren byrnes

## 2025-02-05 NOTE — HISTORY OF PRESENT ILLNESS
[FreeTextEntry1] : 25 REmains VERY frustrated with ongoing facial rash/ pruritus diffusely- and lack of answers.. Comprehensive derm eval by [facility]/ then Dr Causey unremarkable, also seen by immunologist with no clear answers.  Labs 25 extensive evaluation unremarkable:  AVISE + dsdNA high titer > 300 but negative by crithidia.     Plan 25:   25 Last seen  initial presentation RASH..   Still with recurrent rash/ pruritus...  Has high blood histamine level and WBCs have deformity (pelger-huet- ? primary vs. 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  Switched to 300mg biweekly Adbry (IL13 inhibitor) with Ricardo Thompson allergist and face improved, but hasn't taken since Dec 8 - Adbry causes back to break out as well- now off both Saw derm, lipoma taken out of L axillary area- benign Presents with diffuse pruritus today but no overt rash.    No active joint pain/ synovitis no or in past few years   GI Was taking 40mg Omeprazole, EGD found to have severe HH-> required sx (stomach fully within chest wall cavity) minimal symptoms despite severity.  Repair surgery in 2024 (Du), no Sx since and no further need for PPI Liquid diet for 2w around surgery improved her rash.., may have had silent reflux for years beforehand  Osteoporosis Has been taking Evista for several years monotx - no hx fractures.   Exercises 4 days per week for more than 35-40min - pilates, spin  Bone density 2023 Most severe T-score -3.3 at L2 spine -2.7 at R femoral neck No VFA FRAX calcs: overall 24% risk of fracture, 6% risk of hip fracture  Labs 10/24 most recent available Normal CBC, CMP, CRP, S-PEP  Cardio/vasc Experiences some dizziness due to low blood pressure- better lately  REcent superficial blood clot (hx large varicosities) x 2.. was on Eliquis- 2.5 mg BID, now off..   Neg Hepatitis test Quantiferon negative  1) Atopic Dermatitis:  2) Athralgias with + CCP 64, +dsDNA/ DREAD 3) HYpothyroidism 4) Aneurysm Occipital and new onset superficial blood clot BLE with Large varicosities 5) Osteoporosis with hx trauma induced tibial fracture    _________________________________________________________ INitial presentation  - presenting for consultation for flares of rashes.......has had flares in , September and currently presenting in office w/rash throughout scalp, under breasts, neck , and face....she reports that current rash is "calm" today.....she has used antibiotics and creams in the past, but dupixen has been the most effective.....dupixen is currently stopping the itching, but the rash is still present - during most recent flare she had swollen lymph nodes in her neck....and difficulty raising her R shoulder  - No FH of rheumatoid arthritis  - Denies DVT, strokes, or myocardial infarctions.....has had an aneurysm in the past and currently has varicose veins - Hx of miscarriage at 12 weeks.....M1 - Up to date on DEXA, everything was normal  - 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 Osgood.. suggested possible SLE.. with multiple mildly elevated labs:  + DREAD (not quanitified), low + dsDNA, Scl70, low C4, mildly elevated CK, Ferritin and triple + APS studies x 1 (available for review)  Diffuse rash.. etiology unclear after extensive (difffuse) w/u started on dupixent for ezcema with + response.. greatly improved today .. but wants to r/o SLE Denies significant fatigue, fevers/ chills, lymphadenopathy, + lipoma on left chest wall... ys ago hair thinning with hypothyroidism improved with replacement for many  No mucositis, sicca, cardiopulmonary.. till recently now with R sided pleuritic pain (first episode - 1m ago and getting better), no raynauds but hands get numb and tingling in finger tips  severe varicosities.. seen by vasc sx.. no sx indicated , 1 miscarriage at 12 wk (r/t mechanical issues w/ adherance),  + Aneurysm around occipital: presented with severe HA.. watching at this point, no vision change.. HA now resolved.. for past yr.. 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 no MI/ DVT/ PE/ CVA..  Taking 325 mg asa..  REcent tibial fracture with fracture... Dexa:  osteopenia.   Post menopausal    Hematology:  w/u Dr. Banks dx

## 2025-02-05 NOTE — ASSESSMENT
[FreeTextEntry1] : 68 yo w/ atopic dermatitis, Hypothyroidism, + occipital aneurysm (small being followed) and new onset superficial thrombophlebitis x 2 (), osteopenia-> Osteoporosis, and increase in joint pain lately.. feels sx worse since dupixent with suggestion of SLE/ AI process, here for repeat eval.. seen ys ago Dr. Osgood rheum w/ neg w/u beyond DREAD 1:160H/S, now with + CCP, dsDNA and + ACL/ B2G... with low C3/4..  Not seen since ..   CC:  diffuse pruritus, rash- still unclear etiology.. and Osteoporosis  1) Atopic dermatitis: Low complements- CH50 24/ low C3 repeatedly, recently diffuse, failed to respond to traditional tx.. started use of dupixent with excellent control for past few years-> but recent lost efficacy and switched to  Adbry (IL13i only) working with Dr Stark allergist...but developed  severe red/ swollen face and stopped.. now rash remains controlled but diffuse pruritus.  bx  suggests granulomatous changes.?? NO previous EN or other distinct nodular/ ulcerative lesions.  - discussed with Dr. Causey updated labs + ACL/B2G w/ neg LAC and all other serologies negative except low C4.. at 3, will check CH50 but rash has not been urticarial at all ..  Pt asking about high histamine levels/ mast cell activation syndrome (reportedly w/u neg in past)  2) Arthralgias w/ with multiple mildly elevated labs:  + DREAD (not quanitified) and low + CCP 64, low + dsDNA- now neg, Scl70, low C4 and + ACL/ B2G IGG/M/A, mildly elevated CK, Ferritin and triple + APS studies x 1 No recent arthralgias or synovitis (ever)..  One episode pleuritis years ago, nothing recent..  Still denies significant fatigue, fevers/ chills, lymphadenopathy, no mucositis, sicca, cardiopulmonary, absolutely no raynauds but hands get numb and tingling in finger tips. NO evidence otherwise of RA at this time , 1 miscarriage at 12 wk (r/t mechanical issues w/ adherance), with no hx of cytopenias or no MI/ DVT/ PE/ CVA.. See below for new onset thrombophlebitis.. 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 - Varicosities: seen by vasc sx.. no sx indicated, but suggest compression stockings to minimize pooling given APS antibodies  - updated APS eval needed now (especially given CBC with clumped plt) - Consideration for possible Behcet's given vascular changes as described but does NOT have hx IED, mucositis or other features.  Will check [id number]   5) Osteoporosis: Most recent Dexa 23 Most severe T-score -3.3 at L2 spine and -2.7 at R femoral neck No VFA with FRAX overall 24% risk of fracture, 6% risk of hip fracture  tibial fracture trauma induced, - no recent new fractures since . Has been c/w taking Evista for several years monotx    Exercises 4 days per week for more than 35-40min - pilates, spin Menopause: sx age 50 no formal HRT  Tx considerations:  no active dental issues (but hx of multiple issues in past- check routinely), no hx renal calculi, or hx of radiation therapy.   Metabolic w/u: 25 nl PTH/ Ca, Vit D 39, TFTs, CBC, CMP, CRP/ ESR, SPEP, Fe, Mg, Po4, B12/folate, A1c, neg Celiac NOTE: - DXA 19 with most severe T score -2.8  at LFN - 2.4 at spine.   Initially dx  on Fosamax x 10 ys - drug holiday x 1 year.. then  started Evista.. tolerated well- stopped    Discussion:  needs more aggressive tx given obvious progression from previous studies... despite Evista.  Highly recommend use of Prolia for now.. but could theoretically consider PTH.. would not be candidate for romosozumab 2/2 APS studies and thrombophlebitis.  Would suggest starting with Prolia initially given lack of fragility fractures.      Plan 25: - Begin Prolia as soon as approved  - Stop Evista  - Ordered new labs today - check IgE and histamine.  Also concerned for possible APS / or Behcet's labs pending will need RPA   - See dentist at least 2x per year and baseline to confirm no acute issues. If extensive dental issues needed (implants/ extraction) may consider PTH...   - continue to f/u with allergist/ derm to manage rash/ pruritus   - Is aware to call if there is any change in her underlying symptoms.

## 2025-02-05 NOTE — REVIEW OF SYSTEMS
[Lower Ext Edema] : lower extremity edema [Heartburn] : heartburn [As Noted in HPI] : as noted in HPI [Swollen Glands In The Neck] : swollen glands in the neck [Negative] : Heme/Lymph [Joint Pain] : no joint pain [FreeTextEntry2] : stable weight [FreeTextEntry3] : denies IED  [FreeTextEntry4] : Soft teeth- but no active dental issues - absolutely denies oral/ nasal ulcerations  [FreeTextEntry5] : varicose veins - large and NEW onset superficial thrombophlebitis- chronic venous insufficiency [FreeTextEntry7] : VERY large HH s/p repair '24 doing well  [FreeTextEntry8] : medically-induced menopause in 2007- no vaginal ulcerations  [FreeTextEntry9] : denies at this time..  [de-identified] : Rash resolved but ++  pruritus.  Previous diffuse rash throughout face, scalp, neck, and under breasts; denies EN, or other distinct lesions. Denies pathergy [de-identified] : hx mild memory loss in past, minimal now [de-identified] : swollen lymph nodes in neck - in past, not active in past year or more

## 2025-02-05 NOTE — REVIEW OF SYSTEMS
[Lower Ext Edema] : lower extremity edema [Heartburn] : heartburn [As Noted in HPI] : as noted in HPI [Swollen Glands In The Neck] : swollen glands in the neck [Negative] : Heme/Lymph [Joint Pain] : no joint pain [FreeTextEntry2] : stable weight [FreeTextEntry3] : denies IED  [FreeTextEntry4] : Soft teeth- but no active dental issues - absolutely denies oral/ nasal ulcerations  [FreeTextEntry5] : varicose veins - large and NEW onset superficial thrombophlebitis- chronic venous insufficiency [FreeTextEntry7] : VERY large HH s/p repair '24 doing well  [FreeTextEntry8] : medically-induced menopause in 2007- no vaginal ulcerations  [FreeTextEntry9] : denies at this time..  [de-identified] : Rash resolved but ++  pruritus.  Previous diffuse rash throughout face, scalp, neck, and under breasts; denies EN, or other distinct lesions. Denies pathergy [de-identified] : hx mild memory loss in past, minimal now [de-identified] : swollen lymph nodes in neck - in past, not active in past year or more

## 2025-02-05 NOTE — PHYSICAL EXAM
[General Appearance - Alert] : alert [General Appearance - In No Acute Distress] : in no acute distress [General Appearance - Well Nourished] : well nourished [General Appearance - Well Developed] : well developed [General Appearance - Well-Appearing] : healthy appearing [Sclera] : the sclera and conjunctiva were normal [PERRL With Normal Accommodation] : pupils were equal in size, round, and reactive to light [Extraocular Movements] : extraocular movements were intact [Outer Ear] : the ears and nose were normal in appearance [Nasal Cavity] : the nasal mucosa and septum were normal [Oropharynx] : the oropharynx was normal [Neck Appearance] : the appearance of the neck was normal [Neck Cervical Mass (___cm)] : no neck mass was observed [Jugular Venous Distention Increased] : there was no jugular-venous distention [Thyroid Diffuse Enlargement] : the thyroid was not enlarged [Thyroid Nodule] : there were no palpable thyroid nodules [Respiration, Rhythm And Depth] : normal respiratory rhythm and effort [Exaggerated Use Of Accessory Muscles For Inspiration] : no accessory muscle use [Auscultation Breath Sounds / Voice Sounds] : lungs were clear to auscultation bilaterally [Heart Rate And Rhythm] : heart rate was normal and rhythm regular [Heart Sounds] : normal S1 and S2 [Heart Sounds Gallop] : no gallops [Murmurs] : no murmurs [Heart Sounds Pericardial Friction Rub] : no pericardial rub [Full Pulse] : the pedal pulses are present [Edema] : there was no peripheral edema [Abdomen Soft] : soft [Bowel Sounds] : normal bowel sounds [Abdomen Tenderness] : non-tender [] : no hepato-splenomegaly [Abdomen Mass (___ Cm)] : no abdominal mass palpated [Cervical Lymph Nodes Enlarged Posterior Bilaterally] : posterior cervical [Cervical Lymph Nodes Enlarged Anterior Bilaterally] : anterior cervical [Supraclavicular Lymph Nodes Enlarged Bilaterally] : supraclavicular [Axillary Lymph Nodes Enlarged Bilaterally] : axillary [Inguinal Lymph Nodes Enlarged Bilaterally] : inguinal [No CVA Tenderness] : no ~M costovertebral angle tenderness [No Spinal Tenderness] : no spinal tenderness [Skin Color & Pigmentation] : normal skin color and pigmentation [Skin Turgor] : normal skin turgor [Sensation] : the sensory exam was normal to light touch and pinprick [No Focal Deficits] : no focal deficits [Oriented To Time, Place, And Person] : oriented to person, place, and time [Impaired Insight] : insight and judgment were intact [Affect] : the affect was normal [Abnormal Walk] : normal gait [Nail Clubbing] : no clubbing  or cyanosis of the fingernails [Musculoskeletal - Swelling] : no joint swelling seen [Motor Tone] : muscle strength and tone were normal [FreeTextEntry1] : NO active synovitis now or last visit 2022.   There were no flexion contractures or deformities  in any joints. All joints have full ROM  without warmth, erythema, effusion or tenderness.

## 2025-03-31 NOTE — PROCEDURE
[FreeTextEntry1] : L GSV RFA [FreeTextEntry2] : CVI [FreeTextEntry3] : Preoperative Diagnosis: Varicose veins, symptomatic, chronic venous insufficiency (I87.2).          Postoperative Diagnosis: Same      Attending: Chelita Scales MD     Assistant: _       				      Procedure:      1.	Endovenous Radiofrequency Ablation of the L GSV (14181)            Anesthesia: _ Local/Tumescent (50cc 1% Lidocaine in 500cc NS) _ MAC      Complications: none      Cycles: 4        Length of segment treated:16 cm            Description: Chart was reviewed, including ultrasound report and operative plan. Consent was obtained from the patient, risks and benefits of the procedure were explained. Prior to the start of the procedure, the patient was examined in the standing position. The skin was marked to identify superficial varicosities.  The patient was then placed on the procedure table and the entire lower extremity was prepped and a sterile field using barriers was created.  Time out was performed.            1. Using ultrasound guidance a 19-guage needle was placed directly into the saphenous vein. The guide wire was then placed and the needle removed.  A 7F sheath for the RFA catheter was then introduced along the guide wire.  The RFA catheter was then introduced through the sheath.  The catheter tip position was then confirmed at approximately 2 cm from the junction.  The perivenous tissue of the saphenous vein was then anesthetized using Tumescent anesthesia, 50 cc of 0.1% lidocaine solution. The catheter placement was again confirmed using ultrasound 2cm from the saphenous junction. Segmental ablation of the saphenous was performed with radiofrequency energy using 120 degrees Celsius for 20 sec each segment (each segment was treated twice). The catheter was removed and local pressure applied.  Ultrasound was again carried out, which demonstrated significant venospasm of the saphenous vein and no evidence of trauma to the deep veins.                   The incisions and injection sites were covered with 4x4 gauze and abdominal pads and the extremity was wrapped with kerlix and Coban using standard technique.  This was followed by application of a 20-30 mm Hg graduated venous compression hose.              The patient was given oral and written instructions for aftercare, which included frequent ambulation and leg elevation.  The patient was instructed to take NSAIDs.  The patient was scheduled for a follow-up visit in approximately 5-7 days with a venous duplex ultrasound of the treated leg to evaluate for success of procedure and evaluate for any deep vein thrombosis. The patient tolerated the procedure well and left the office in excellent condition ambulating without difficulty.

## 2025-04-02 NOTE — PROCEDURE
[FreeTextEntry1] : L SSV RFA [FreeTextEntry2] : CVI [FreeTextEntry3] : Preoperative Diagnosis: Varicose veins, symptomatic, chronic venous insufficiency (I87.2).          Postoperative Diagnosis: Same      Attending: Chelita Scales MD     Assistant: KATARINA Orlando      				      Procedure:      1.	Endovenous Radiofrequency Ablation of the L SSV (86276)            Anesthesia: x Local/Tumescent (50cc 1% Lidocaine in 500cc NS) _ MAC      Complications: none      Cycles: 4            Length of segment treated:16 cm            Description: Chart was reviewed, including ultrasound report and operative plan. Consent was obtained from the patient, risks and benefits of the procedure were explained. Prior to the start of the procedure, the patient was examined in the standing position. The skin was marked to identify superficial varicosities.  The patient was then placed on the procedure table and the entire lower extremity was prepped and a sterile field using barriers was created.  Time out was performed.            1. Using ultrasound guidance a 19-guage needle was placed directly into the saphenous vein. The guide wire was then placed and the needle removed.  A 7F sheath for the RFA catheter was then introduced along the guide wire.  The RFA catheter was then introduced through the sheath.  The catheter tip position was then confirmed at approximately 2 cm from the junction.  The perivenous tissue of the saphenous vein was then anesthetized using Tumescent anesthesia, 50 cc of 0.1% lidocaine solution. The catheter placement was again confirmed using ultrasound 2cm from the saphenous junction. Segmental ablation of the saphenous was performed with radiofrequency energy using 120 degrees Celsius for 20 sec each segment (each segment was treated twice). The catheter was removed and local pressure applied.  Ultrasound was again carried out, which demonstrated significant venospasm of the saphenous vein and no evidence of trauma to the deep veins.                   The incisions and injection sites were covered with 4x4 gauze and abdominal pads and the extremity was wrapped with kerlix and Coban using standard technique.  This was followed by application of a 20-30 mm Hg graduated venous compression hose.              The patient was given oral and written instructions for aftercare, which included frequent ambulation and leg elevation.  The patient was instructed to take NSAIDs.  The patient was scheduled for a follow-up visit in approximately 5-7 days with a venous duplex ultrasound of the treated leg to evaluate for success of procedure and evaluate for any deep vein thrombosis. The patient tolerated the procedure well and left the office in excellent condition ambulating without difficulty.

## 2025-04-09 NOTE — PROCEDURE
[FreeTextEntry1] : Left SAP UGS  [FreeTextEntry2] : Chronic Venous Insufficiency, Varicose veins with pain  [FreeTextEntry3] : Preoperative Diagnosis: Varicose veins, symptomatic, chronic venous insufficiency (I87.2).        Postoperative Diagnosis: Same       Attending:  Chelita Scales MD      Assistant: DONNA Carver        				       Procedure:       1.	Left leg, Ultrasound guided sclerotherapy, multiple veins (63628)       2.	Left Leg Ambulatory Phlebectomy x 25 (03108)              Anesthesia:  Local/Tumescent (50cc 1% Lidocaine in 500cc NS)   Complications: none              Description: Chart was reviewed, including ultrasound report and operative plan. Consent was obtained from the patient, risks and benefits of the procedure were explained. Prior to the start of the procedure, the patient was examined in the standing position. The skin was marked to identify superficial varicosities.  The patient was then placed on the procedure table and the entire lower extremity was prepped and a sterile field using barriers was created.    Time out was performed.              1. The deeper varicosities in the left leg were treated with ultrasound-guided sclerotherapy. Ultrasound was used to identify the extent and distribution of the deeper varicosities. Multiple varicosities were accessed with a 25G needle and injected with 1% STS. Compression of the veins using ultrasound was performed.  Patient was kept in Trendelenburg position for 10 minutes and instructed to perform dorsi/plantar flexion of the foot.                2. The area of the marked surface varicosities was anesthetized using tumescent anesthesia, 350 cc of 0.1% lidocaine solution. The marked varicose veins were then extracted using a micro-incisional avulsion technique and 25 separate stab incisions were made with a 16G Nokor Needle along the course of the varicosities.  The diseased vein segments were removed using phlebectomy hooks and mosquitoes.  Hemostasis was obtained with compression and wash-out of the subcutaneous space.            The incisions and injection sites were covered with 4x4 gauze and abdominal pads and the extremity was wrapped with kerlix and Coban using standard technique.  This was followed by application of a 20-30 mm Hg graduated venous compression hose.                The patient was given oral and written instructions for aftercare, which included frequent ambulation and leg elevation.  The patient was instructed to take NSAIDs.  The patient was scheduled for a follow-up visit in approximately 5-7 days with a venous duplex ultrasound of the treated leg to evaluate for success of procedure and evaluate for any deep vein thrombosis. The patient tolerated the procedure well and left the office in excellent condition ambulating without difficulty.

## 2025-04-15 NOTE — REASON FOR VISIT
[de-identified] : 68 year old female presents for post op s/p left sap ugs one week ago. Doing well. Denies pain ,edema and reports compliance with compression.    ROS- negative   PE:  sap incisions healing well. No cellulitis  resolving mild superficial thrombophlebitis medial calf  no cellulitis  RLE with few posterior calf small varicosities and reticular veins   VDX:  no evidence of DVT  scattered thrombophlebitis of the calf   A/P:  Doing well s/p left sap ugs, no evidence of DVT; few persistent dilated veins right calf;   -encouraged continued compression hose  -would benefit from additional right sap ugs (post calf ) possibly in the future -advised to RTC in 6 months for eval

## 2025-05-29 NOTE — PHYSICAL EXAM
[Normal] : uterus [Atrophy] : atrophy [Tenderness] : tenderness [No Bleeding] : there was no active vaginal bleeding [Uterine Adnexae] : were not tender and not enlarged

## 2025-05-29 NOTE — CHIEF COMPLAINT
[FreeTextEntry1] : 68 year old postmenopausal female s/p total hysterectomy in 2007 presents today for vaginal irritation. She reports vaginal itching and at time get cuts in vagina.

## 2025-05-29 NOTE — DISCUSSION/SUMMARY
[FreeTextEntry1] : Education provided about vaginal atrophic changes secondary to . Patient agrees reluctant to start estradiol cream. Discussed benefits and need to continue treatment x2 weekly. Advised may take 3-4 months to see improvements in symptoms. Patient agreeable with plan and advised to discuss at annual visit if any notable improvement in August.